# Patient Record
Sex: FEMALE | Race: WHITE | NOT HISPANIC OR LATINO | URBAN - METROPOLITAN AREA
[De-identification: names, ages, dates, MRNs, and addresses within clinical notes are randomized per-mention and may not be internally consistent; named-entity substitution may affect disease eponyms.]

---

## 2019-10-24 ENCOUNTER — OFFICE (OUTPATIENT)
Dept: URBAN - METROPOLITAN AREA CLINIC 11 | Facility: CLINIC | Age: 79
End: 2019-10-24
Payer: COMMERCIAL

## 2019-10-24 VITALS
SYSTOLIC BLOOD PRESSURE: 142 MMHG | WEIGHT: 145 LBS | DIASTOLIC BLOOD PRESSURE: 68 MMHG | HEIGHT: 68 IN | HEART RATE: 50 BPM

## 2019-10-24 DIAGNOSIS — A09 INFECTIOUS GASTROENTERITIS AND COLITIS, UNSPECIFIED: ICD-10-CM

## 2019-10-24 PROCEDURE — 99202 OFFICE O/P NEW SF 15 MIN: CPT

## 2020-02-19 ENCOUNTER — OFFICE (OUTPATIENT)
Dept: URBAN - METROPOLITAN AREA CLINIC 11 | Facility: CLINIC | Age: 80
End: 2020-02-19

## 2020-02-19 VITALS
HEIGHT: 68 IN | HEART RATE: 58 BPM | WEIGHT: 138 LBS | DIASTOLIC BLOOD PRESSURE: 64 MMHG | SYSTOLIC BLOOD PRESSURE: 146 MMHG

## 2020-02-19 DIAGNOSIS — A09 INFECTIOUS GASTROENTERITIS AND COLITIS, UNSPECIFIED: ICD-10-CM

## 2020-02-19 LAB
CRYPTOSPORIDIUM EIA: NEGATIVE
GIARDIA, EIA, OVA/PARASITE: GIARDIA LAMBLIA AG, EIA: NEGATIVE
GIARDIA, EIA, OVA/PARASITE: OVA + PARASITE EXAM: ABNORMAL
MAGNESIUM: 2.2 MG/DL (ref 1.6–2.3)
RENAL PANEL (10): ALBUMIN: 4 G/DL (ref 3.7–4.7)
RENAL PANEL (10): BUN/CREATININE RATIO: 23 (ref 12–28)
RENAL PANEL (10): BUN: 21 MG/DL (ref 8–27)
RENAL PANEL (10): CALCIUM: 9.5 MG/DL (ref 8.7–10.3)
RENAL PANEL (10): CARBON DIOXIDE, TOTAL: 24 MMOL/L (ref 20–29)
RENAL PANEL (10): CHLORIDE: 102 MMOL/L (ref 96–106)
RENAL PANEL (10): CREATININE: 0.91 MG/DL (ref 0.57–1)
RENAL PANEL (10): EGFR IF AFRICN AM: 70 ML/MIN/1.73 (ref 59–?)
RENAL PANEL (10): EGFR IF NONAFRICN AM: 61 ML/MIN/1.73 (ref 59–?)
RENAL PANEL (10): GLUCOSE: 88 MG/DL (ref 65–99)
RENAL PANEL (10): PHOSPHORUS: 3.2 MG/DL (ref 3–4.3)
RENAL PANEL (10): POTASSIUM: 4.2 MMOL/L (ref 3.5–5.2)
RENAL PANEL (10): SODIUM: 141 MMOL/L (ref 134–144)
RESULT: RESULT 1: (no result)
RESULT: RESULT 1: (no result)
RESULT: RESULT 1: ABNORMAL
STOOL CULTURE: CAMPYLOBACTER CULTURE: (no result)
STOOL CULTURE: E COLI SHIGA TOXIN EIA: NEGATIVE
STOOL CULTURE: SALMONELLA/SHIGELLA SCREEN: (no result)

## 2020-02-19 PROCEDURE — 99214 OFFICE O/P EST MOD 30 MIN: CPT

## 2020-02-19 NOTE — INTERFACERESULTNOTES
D/w pt. Pt voiced understanding and agrees. [facility] are resolving. [facility]